# Patient Record
Sex: FEMALE | Race: OTHER | URBAN - METROPOLITAN AREA
[De-identification: names, ages, dates, MRNs, and addresses within clinical notes are randomized per-mention and may not be internally consistent; named-entity substitution may affect disease eponyms.]

---

## 2020-11-20 ENCOUNTER — IMPORTED ENCOUNTER (OUTPATIENT)
Dept: URBAN - METROPOLITAN AREA CLINIC 50 | Facility: CLINIC | Age: 50
End: 2020-11-20

## 2020-11-20 NOTE — PATIENT DISCUSSION
"""Based on increased C/D ratio. Plan visual field and OCT RNFL.  Informed patient Dr. Curly Amador ""

## 2020-11-23 ENCOUNTER — IMPORTED ENCOUNTER (OUTPATIENT)
Dept: URBAN - METROPOLITAN AREA CLINIC 50 | Facility: CLINIC | Age: 50
End: 2020-11-23

## 2020-11-30 ENCOUNTER — IMPORTED ENCOUNTER (OUTPATIENT)
Dept: URBAN - METROPOLITAN AREA CLINIC 50 | Facility: CLINIC | Age: 50
End: 2020-11-30

## 2021-04-17 ASSESSMENT — VISUAL ACUITY
OD_CC: J1+
OS_CC: J1+
OS_SC: 20/20
OD_SC: 20/20

## 2021-04-17 ASSESSMENT — TONOMETRY
OD_IOP_MMHG: 17
OS_IOP_MMHG: 17

## 2022-04-04 ENCOUNTER — PREPPED CHART (OUTPATIENT)
Dept: URBAN - METROPOLITAN AREA CLINIC 52 | Facility: CLINIC | Age: 52
End: 2022-04-04

## 2022-04-22 ENCOUNTER — COMPREHENSIVE EXAM (OUTPATIENT)
Dept: URBAN - METROPOLITAN AREA CLINIC 52 | Facility: CLINIC | Age: 52
End: 2022-04-22

## 2022-04-22 DIAGNOSIS — H25.13: ICD-10-CM

## 2022-04-22 PROCEDURE — 92015 DETERMINE REFRACTIVE STATE: CPT

## 2022-04-22 PROCEDURE — 92014 COMPRE OPH EXAM EST PT 1/>: CPT

## 2022-04-22 ASSESSMENT — TONOMETRY
OS_IOP_MMHG: 16
OD_IOP_MMHG: 16

## 2022-04-22 ASSESSMENT — VISUAL ACUITY
OS_GLARE: 20/30
OD_GLARE: 20/30
OU_SC: 20/20
OD_SC: 20/25
OS_SC: 20/25
OU_CC: J1+

## 2023-01-26 ENCOUNTER — PREPPED CHART (OUTPATIENT)
Dept: URBAN - METROPOLITAN AREA CLINIC 52 | Facility: CLINIC | Age: 53
End: 2023-01-26

## 2023-07-14 ENCOUNTER — COMPREHENSIVE EXAM (OUTPATIENT)
Dept: URBAN - METROPOLITAN AREA CLINIC 52 | Facility: CLINIC | Age: 53
End: 2023-07-14

## 2023-07-14 DIAGNOSIS — Z01.01: ICD-10-CM

## 2023-07-14 DIAGNOSIS — H52.4: ICD-10-CM

## 2023-07-14 PROCEDURE — 92015 DETERMINE REFRACTIVE STATE: CPT

## 2023-07-14 PROCEDURE — 92014 COMPRE OPH EXAM EST PT 1/>: CPT

## 2023-07-14 ASSESSMENT — VISUAL ACUITY
OD_SC: 20/30-1
OU_SC: 20/30-1
OD_PH: 20/20-1
OS_SC: 20/40-1
OU_CC: J1+
OS_PH: 20/20-2

## 2023-07-14 ASSESSMENT — TONOMETRY
OS_IOP_MMHG: 13
OD_IOP_MMHG: 13

## 2024-07-26 ENCOUNTER — COMPREHENSIVE EXAM (OUTPATIENT)
Dept: URBAN - METROPOLITAN AREA CLINIC 52 | Facility: CLINIC | Age: 54
End: 2024-07-26

## 2024-07-26 DIAGNOSIS — H40.013: ICD-10-CM

## 2024-07-26 DIAGNOSIS — H02.834: ICD-10-CM

## 2024-07-26 DIAGNOSIS — H52.4: ICD-10-CM

## 2024-07-26 DIAGNOSIS — Z01.01: ICD-10-CM

## 2024-07-26 DIAGNOSIS — H02.831: ICD-10-CM

## 2024-07-26 DIAGNOSIS — H25.13: ICD-10-CM

## 2024-07-26 DIAGNOSIS — H35.362: ICD-10-CM

## 2024-07-26 PROCEDURE — 92014 COMPRE OPH EXAM EST PT 1/>: CPT

## 2024-07-26 PROCEDURE — 92015 DETERMINE REFRACTIVE STATE: CPT

## 2024-07-26 ASSESSMENT — KERATOMETRY
OS_AXISANGLE_DEGREES: 0
OD_AXISANGLE_DEGREES: 175
OS_AXISANGLE2_DEGREES: 90
OD_AXISANGLE2_DEGREES: 85
OS_K2POWER_DIOPTERS: 42.50
OS_K1POWER_DIOPTERS: 42.50
OD_K1POWER_DIOPTERS: 42.25
OD_K2POWER_DIOPTERS: 42.50

## 2024-07-26 ASSESSMENT — VISUAL ACUITY
OS_GLARE: 20/25
OD_SC: 20/60
OD_GLARE: 20/20
OD_PH: 20/25
OS_SC: 20/60
OU_SC: 20/50
OD_GLARE: 20/25
OS_GLARE: 20/20
OU_CC: J1

## 2024-07-26 ASSESSMENT — TONOMETRY
OS_IOP_MMHG: 14
OD_IOP_MMHG: 14

## 2025-08-05 ENCOUNTER — COMPREHENSIVE EXAM (OUTPATIENT)
Age: 55
End: 2025-08-05

## 2025-08-05 DIAGNOSIS — Z01.01: ICD-10-CM

## 2025-08-05 DIAGNOSIS — H52.4: ICD-10-CM

## 2025-08-05 PROCEDURE — 92014 COMPRE OPH EXAM EST PT 1/>: CPT

## 2025-08-05 PROCEDURE — 92015 DETERMINE REFRACTIVE STATE: CPT
